# Patient Record
Sex: FEMALE | Race: WHITE | NOT HISPANIC OR LATINO
[De-identification: names, ages, dates, MRNs, and addresses within clinical notes are randomized per-mention and may not be internally consistent; named-entity substitution may affect disease eponyms.]

---

## 2019-04-16 PROBLEM — Z00.00 ENCOUNTER FOR PREVENTIVE HEALTH EXAMINATION: Status: ACTIVE | Noted: 2019-04-16

## 2019-05-16 ENCOUNTER — APPOINTMENT (OUTPATIENT)
Dept: RHEUMATOLOGY | Facility: CLINIC | Age: 74
End: 2019-05-16
Payer: MEDICARE

## 2019-05-16 VITALS
DIASTOLIC BLOOD PRESSURE: 80 MMHG | OXYGEN SATURATION: 96 % | SYSTOLIC BLOOD PRESSURE: 146 MMHG | BODY MASS INDEX: 28 KG/M2 | HEIGHT: 63 IN | WEIGHT: 158 LBS | HEART RATE: 78 BPM

## 2019-05-16 DIAGNOSIS — Z78.9 OTHER SPECIFIED HEALTH STATUS: ICD-10-CM

## 2019-05-16 PROCEDURE — 99205 OFFICE O/P NEW HI 60 MIN: CPT

## 2019-05-16 RX ORDER — ADHESIVE TAPE 3"X 2.3 YD
50 MCG TAPE, NON-MEDICATED TOPICAL
Refills: 0 | Status: ACTIVE | COMMUNITY

## 2019-05-16 NOTE — PHYSICAL EXAM
[General Appearance - Alert] : alert [General Appearance - Well Developed] : well developed [General Appearance - Well Nourished] : well nourished [General Appearance - In No Acute Distress] : in no acute distress [Sclera] : the sclera and conjunctiva were normal [Auscultation Breath Sounds / Voice Sounds] : lungs were clear to auscultation bilaterally [Cervical Lymph Nodes Enlarged Posterior Bilaterally] : posterior cervical [Cervical Lymph Nodes Enlarged Anterior Bilaterally] : anterior cervical [] : no rash [FreeTextEntry1] : There is bilateral GH tenderness. There is elbow joint line tenderness, left > right. There is left wrist tenderness. There is pain on ROM of the hips bilaterally. There is joint line tenderness of the left knee without swelling. There is MTP tenderness bilaterally.  There are marked Heberden's and Thelma's nodes. There is minimal crepitus of the right GH joint and in the knees. There is TMJ tenderness bilaterally

## 2019-05-16 NOTE — REASON FOR VISIT
[Initial Evaluation] : an initial evaluation [Family Member] : family member [FreeTextEntry1] : Shoulder and hip girdle pain and weakness

## 2019-05-16 NOTE — REVIEW OF SYSTEMS
[Joint Pain] : joint pain [Joint Stiffness] : joint stiffness [Fever] : no fever [Eye Pain] : no eye pain [Red Eyes] : eyes not red [Cough] : no cough [Shortness Of Breath] : no shortness of breath [Abdominal Pain] : no abdominal pain [Diarrhea] : no diarrhea [Dysuria] : no dysuria [Joint Swelling] : no joint swelling [Skin Lesions] : no skin lesions [FreeTextEntry6] : No pleuritic C/P

## 2019-05-16 NOTE — HISTORY OF PRESENT ILLNESS
[FreeTextEntry1] : Patient reports having started about one month ago with should discomfort with pain, AM stiffness and weakness with severe fatigue.  Her hip started to become symptomatic, and she presented to [facility] ED, and was referred here for evaluation. There has been no temporal HAs or visual loss, but is having symptoms of jaw claudication. There is no fever or weight loss.

## 2019-05-17 LAB
ALBUMIN SERPL ELPH-MCNC: 4.3 G/DL
ALP BLD-CCNC: 73 U/L
ALT SERPL-CCNC: 9 U/L
ANION GAP SERPL CALC-SCNC: 14 MMOL/L
AST SERPL-CCNC: 17 U/L
BASOPHILS # BLD AUTO: 0.04 K/UL
BASOPHILS NFR BLD AUTO: 0.5 %
BILIRUB SERPL-MCNC: 0.3 MG/DL
BUN SERPL-MCNC: 24 MG/DL
CALCIUM SERPL-MCNC: 9.5 MG/DL
CCP AB SER IA-ACNC: <8 UNITS
CHLORIDE SERPL-SCNC: 104 MMOL/L
CO2 SERPL-SCNC: 25 MMOL/L
CREAT SERPL-MCNC: 1.2 MG/DL
CRP SERPL-MCNC: 3.59 MG/DL
DSDNA AB SER-ACNC: 152 IU/ML
EOSINOPHIL # BLD AUTO: 0.3 K/UL
EOSINOPHIL NFR BLD AUTO: 4 %
ERYTHROCYTE [SEDIMENTATION RATE] IN BLOOD BY WESTERGREN METHOD: 53 MM/HR
GLUCOSE SERPL-MCNC: 87 MG/DL
HCT VFR BLD CALC: 44.8 %
HGB BLD-MCNC: 13.3 G/DL
IMM GRANULOCYTES NFR BLD AUTO: 0.1 %
LYMPHOCYTES # BLD AUTO: 1.51 K/UL
LYMPHOCYTES NFR BLD AUTO: 20.3 %
MAN DIFF?: NORMAL
MCHC RBC-ENTMCNC: 26 PG
MCHC RBC-ENTMCNC: 29.7 GM/DL
MCV RBC AUTO: 87.5 FL
MONOCYTES # BLD AUTO: 0.67 K/UL
MONOCYTES NFR BLD AUTO: 9 %
NEUTROPHILS # BLD AUTO: 4.91 K/UL
NEUTROPHILS NFR BLD AUTO: 66.1 %
PLATELET # BLD AUTO: 287 K/UL
POTASSIUM SERPL-SCNC: 4.9 MMOL/L
PROT SERPL-MCNC: 6.8 G/DL
RBC # BLD: 5.12 M/UL
RBC # FLD: 14.4 %
RF+CCP IGG SER-IMP: NEGATIVE
RHEUMATOID FACT SER QL: <10 IU/ML
SODIUM SERPL-SCNC: 143 MMOL/L
WBC # FLD AUTO: 7.44 K/UL

## 2019-05-20 ENCOUNTER — OTHER (OUTPATIENT)
Age: 74
End: 2019-05-20

## 2019-05-20 LAB
ANA PAT FLD IF-IMP: ABNORMAL
ANA SER IF-ACNC: ABNORMAL
ENA SS-A AB SER IA-ACNC: <0.2 AL
ENA SS-B AB SER IA-ACNC: <0.2 AL

## 2019-05-24 ENCOUNTER — APPOINTMENT (OUTPATIENT)
Dept: RHEUMATOLOGY | Facility: CLINIC | Age: 74
End: 2019-05-24

## 2019-05-29 ENCOUNTER — RECORD ABSTRACTING (OUTPATIENT)
Age: 74
End: 2019-05-29

## 2019-05-29 ENCOUNTER — APPOINTMENT (OUTPATIENT)
Dept: RHEUMATOLOGY | Facility: CLINIC | Age: 74
End: 2019-05-29
Payer: MEDICARE

## 2019-05-29 VITALS
HEART RATE: 79 BPM | BODY MASS INDEX: 28.35 KG/M2 | HEIGHT: 63 IN | SYSTOLIC BLOOD PRESSURE: 138 MMHG | WEIGHT: 160 LBS | DIASTOLIC BLOOD PRESSURE: 72 MMHG

## 2019-05-29 DIAGNOSIS — K76.0 FATTY (CHANGE OF) LIVER, NOT ELSEWHERE CLASSIFIED: ICD-10-CM

## 2019-05-29 DIAGNOSIS — F41.9 ANXIETY DISORDER, UNSPECIFIED: ICD-10-CM

## 2019-05-29 DIAGNOSIS — Z87.19 PERSONAL HISTORY OF OTHER DISEASES OF THE DIGESTIVE SYSTEM: ICD-10-CM

## 2019-05-29 PROCEDURE — 99214 OFFICE O/P EST MOD 30 MIN: CPT

## 2019-05-29 NOTE — PHYSICAL EXAM
[General Appearance - Alert] : alert [General Appearance - In No Acute Distress] : in no acute distress [General Appearance - Well Nourished] : well nourished [General Appearance - Well Developed] : well developed [] : no rash [FreeTextEntry1] : There are no palpable TA cords, but there is bilateral temproal tenderness.

## 2019-05-29 NOTE — REVIEW OF SYSTEMS
[Joint Pain] : joint pain [Joint Stiffness] : joint stiffness [Eye Pain] : no eye pain [Fever] : no fever [Red Eyes] : eyes not red [Shortness Of Breath] : no shortness of breath [Cough] : no cough [Abdominal Pain] : no abdominal pain [Diarrhea] : no diarrhea [Dysuria] : no dysuria [Joint Swelling] : no joint swelling [Skin Lesions] : no skin lesions [FreeTextEntry6] : No pleuritic C/P

## 2019-05-29 NOTE — HISTORY OF PRESENT ILLNESS
[FreeTextEntry1] : Feeling better on low dose prednisone, with less joint pain and stiffness. Patient tripped and fell one week ago, and has some mild pain in the right low back area.

## 2019-07-03 ENCOUNTER — RX RENEWAL (OUTPATIENT)
Age: 74
End: 2019-07-03

## 2019-07-26 ENCOUNTER — RECORD ABSTRACTING (OUTPATIENT)
Age: 74
End: 2019-07-26

## 2019-07-26 DIAGNOSIS — I49.9 CARDIAC ARRHYTHMIA, UNSPECIFIED: ICD-10-CM

## 2019-07-29 ENCOUNTER — APPOINTMENT (OUTPATIENT)
Dept: CARDIOLOGY | Facility: CLINIC | Age: 74
End: 2019-07-29

## 2019-07-30 NOTE — REASON FOR VISIT
[FreeTextEntry1] : Ms. PENELOPE SILVA has the following problem list.\par \par Hypertension\par Palpitations/cardiac arrhythmia/nonsustained atrial tachycardia\par Previous evaluation for dyspnea.\par \par She has additional medical problems as noted.\par \par Her primary care provider is Doctor Marv Lala\par

## 2019-07-30 NOTE — ADDENDUM
[FreeTextEntry1] : This report was generated using voice recognition software. Please excuse obvious typographical errors and contact this office for any questions. Any preliminary copy of this note given to the patient at the time of this visit has not been proofread or edited. This note is part of a shared electronic record used by our providers and may contain information generated by others in addition to those entries made during today's visit.\par

## 2019-08-29 ENCOUNTER — APPOINTMENT (OUTPATIENT)
Dept: RHEUMATOLOGY | Facility: CLINIC | Age: 74
End: 2019-08-29
Payer: MEDICARE

## 2019-08-29 VITALS
BODY MASS INDEX: 27.64 KG/M2 | RESPIRATION RATE: 18 BRPM | SYSTOLIC BLOOD PRESSURE: 134 MMHG | DIASTOLIC BLOOD PRESSURE: 72 MMHG | HEART RATE: 85 BPM | HEIGHT: 63 IN | WEIGHT: 156 LBS | OXYGEN SATURATION: 95 %

## 2019-08-29 PROCEDURE — 99214 OFFICE O/P EST MOD 30 MIN: CPT

## 2019-08-29 RX ORDER — AMLODIPINE BESYLATE 5 MG/1
5 TABLET ORAL
Refills: 0 | Status: DISCONTINUED | COMMUNITY
End: 2019-08-29

## 2019-08-29 RX ORDER — PAROXETINE HYDROCHLORIDE 30 MG/1
30 TABLET, FILM COATED ORAL
Refills: 0 | Status: DISCONTINUED | COMMUNITY
End: 2019-08-29

## 2019-08-29 RX ORDER — NAPROXEN 500 MG/1
500 TABLET ORAL
Refills: 0 | Status: DISCONTINUED | COMMUNITY
End: 2019-08-29

## 2019-08-29 NOTE — HISTORY OF PRESENT ILLNESS
[FreeTextEntry1] : Patient reports groin pain (hip pain) has resolved while on Plaquenil, but now having increased pain in the upper back, shoulders and neck; fells crepitations in the neck and shoulder while moving and after increased activity. No progression of symptoms related to BIN positivity.

## 2019-08-29 NOTE — PHYSICAL EXAM
[General Appearance - Alert] : alert [General Appearance - In No Acute Distress] : in no acute distress [General Appearance - Well Nourished] : well nourished [General Appearance - Well Developed] : well developed [] : no rash [FreeTextEntry1] : There is no evidence of active synovitis at this time. There are prominent Heberden's nodes. There is crepitus of the GH joints.

## 2019-08-29 NOTE — REVIEW OF SYSTEMS
[Joint Pain] : joint pain [Joint Stiffness] : joint stiffness [Fever] : no fever [Eye Pain] : no eye pain [Red Eyes] : eyes not red [Shortness Of Breath] : no shortness of breath [Cough] : no cough [Abdominal Pain] : no abdominal pain [Diarrhea] : no diarrhea [Dysuria] : no dysuria [Joint Swelling] : no joint swelling [Skin Lesions] : no skin lesions [FreeTextEntry6] : No pleuritic C/P

## 2019-08-31 LAB
APPEARANCE: ABNORMAL
BACTERIA: NEGATIVE
BILIRUBIN URINE: NEGATIVE
BLOOD URINE: NEGATIVE
COLOR: YELLOW
CREAT SPEC-SCNC: 130 MG/DL
CREAT/PROT UR: 0.1 RATIO
GLUCOSE QUALITATIVE U: NEGATIVE
HYALINE CASTS: 0 /LPF
KETONES URINE: NEGATIVE
LEUKOCYTE ESTERASE URINE: ABNORMAL
MICROSCOPIC-UA: NORMAL
NITRITE URINE: NEGATIVE
PH URINE: 5.5
PROT UR-MCNC: 15 MG/DL
PROTEIN URINE: ABNORMAL
RED BLOOD CELLS URINE: 0 /HPF
SPECIFIC GRAVITY URINE: 1.02
SQUAMOUS EPITHELIAL CELLS: 14 /HPF
UROBILINOGEN URINE: NORMAL
WHITE BLOOD CELLS URINE: 7 /HPF

## 2019-10-14 ENCOUNTER — RX RENEWAL (OUTPATIENT)
Age: 74
End: 2019-10-14

## 2019-12-05 ENCOUNTER — APPOINTMENT (OUTPATIENT)
Dept: RHEUMATOLOGY | Facility: CLINIC | Age: 74
End: 2019-12-05
Payer: MEDICARE

## 2019-12-05 VITALS
HEIGHT: 60 IN | SYSTOLIC BLOOD PRESSURE: 112 MMHG | WEIGHT: 153 LBS | BODY MASS INDEX: 30.04 KG/M2 | DIASTOLIC BLOOD PRESSURE: 74 MMHG

## 2019-12-05 DIAGNOSIS — E50.7 OTHER OCULAR MANIFESTATIONS OF VITAMIN A DEFICIENCY: ICD-10-CM

## 2019-12-05 PROCEDURE — 99214 OFFICE O/P EST MOD 30 MIN: CPT

## 2019-12-05 NOTE — REVIEW OF SYSTEMS
[Joint Stiffness] : joint stiffness [Joint Pain] : joint pain [Fever] : no fever [Red Eyes] : eyes not red [Shortness Of Breath] : no shortness of breath [Eye Pain] : no eye pain [Cough] : no cough [Abdominal Pain] : no abdominal pain [Diarrhea] : no diarrhea [Dysuria] : no dysuria [Joint Swelling] : no joint swelling [Skin Lesions] : no skin lesions [FreeTextEntry6] : No pleuritic C/P

## 2019-12-05 NOTE — HISTORY OF PRESENT ILLNESS
[FreeTextEntry1] : Patient reports that there is some significant improvement in joint symptoms after making the changes in the medication regimen at last visit. she is complaining of increasing dry eyes, using Visine without benefit. Is now seeing ophtho for retinopathy monitoring.

## 2020-01-21 ENCOUNTER — RX RENEWAL (OUTPATIENT)
Age: 75
End: 2020-01-21

## 2020-01-27 ENCOUNTER — RX RENEWAL (OUTPATIENT)
Age: 75
End: 2020-01-27

## 2020-05-14 ENCOUNTER — APPOINTMENT (OUTPATIENT)
Dept: RHEUMATOLOGY | Facility: CLINIC | Age: 75
End: 2020-05-14
Payer: MEDICARE

## 2020-05-14 VITALS — HEIGHT: 60 IN | WEIGHT: 151 LBS | BODY MASS INDEX: 29.64 KG/M2

## 2020-05-14 PROCEDURE — 20610 DRAIN/INJ JOINT/BURSA W/O US: CPT

## 2020-05-14 PROCEDURE — 99214 OFFICE O/P EST MOD 30 MIN: CPT | Mod: 25

## 2020-05-14 RX ADMIN — Medication 0 MG/ML: at 00:00

## 2020-05-14 NOTE — REVIEW OF SYSTEMS
[Joint Pain] : joint pain [Joint Stiffness] : joint stiffness [Fever] : no fever [Eye Pain] : no eye pain [Red Eyes] : eyes not red [Shortness Of Breath] : no shortness of breath [Cough] : no cough [Abdominal Pain] : no abdominal pain [Diarrhea] : no diarrhea [Dysuria] : no dysuria [Joint Swelling] : no joint swelling [FreeTextEntry6] : No pleuritic C/P [Skin Lesions] : no skin lesions

## 2020-05-14 NOTE — PROCEDURE
[FreeTextEntry1] : Right trochanteric bursa area prepped with betadine; 2% lido applied to subQ tissue; 2cc 2% lido and 2cc Kenalog 40 (80 mg) injected into area of right trochanteric bursa with good results and no complications. Has not recently seen ophtho because of Covid restrictions, but is planning on seeing ophtho regularly.

## 2020-05-14 NOTE — HISTORY OF PRESENT ILLNESS
[FreeTextEntry1] : Patient reports progressively severe pain in the lateral aspect of the right hip, making it very difficult to walk. Pain radiates into lateral aspect of right leg. she does report that changing her antidepressant to duloxetine at the last visit did give relief of OA symptoms. No progression of symptoms.

## 2020-05-14 NOTE — PHYSICAL EXAM
[General Appearance - In No Acute Distress] : in no acute distress [General Appearance - Alert] : alert [General Appearance - Well Developed] : well developed [General Appearance - Well Nourished] : well nourished [] : no rash [FreeTextEntry1] : There is no evidence of active synovitis at this time. There are prominent Heberden's nodes. There is crepitus of the GH joints. There is no pain on ROM of the right hip. There is severe local tenderness over the right trochanteric bursa area - this reproduces patient's CC.

## 2020-05-15 RX ORDER — TRIAMCINOLONE ACETONIDE 40 MG/ML
40 SUSPENSION INTRA-ARTERIAL; INTRAMUSCULAR
Qty: 1 | Refills: 0 | Status: COMPLETED
Start: 2020-05-14

## 2020-05-15 RX ORDER — TRIAMCINOLONE ACETONIDE 40 MG/ML
40 SUSPENSION INTRA-ARTERIAL; INTRAMUSCULAR
Qty: 1 | Refills: 0 | Status: COMPLETED | OUTPATIENT
Start: 2020-05-14

## 2020-10-28 ENCOUNTER — RX RENEWAL (OUTPATIENT)
Age: 75
End: 2020-10-28

## 2020-11-11 ENCOUNTER — APPOINTMENT (OUTPATIENT)
Dept: RHEUMATOLOGY | Facility: CLINIC | Age: 75
End: 2020-11-11
Payer: MEDICARE

## 2020-11-11 VITALS
BODY MASS INDEX: 28.66 KG/M2 | WEIGHT: 146 LBS | DIASTOLIC BLOOD PRESSURE: 68 MMHG | SYSTOLIC BLOOD PRESSURE: 120 MMHG | HEIGHT: 60 IN

## 2020-11-11 DIAGNOSIS — I87.2 VENOUS INSUFFICIENCY (CHRONIC) (PERIPHERAL): ICD-10-CM

## 2020-11-11 PROCEDURE — 99214 OFFICE O/P EST MOD 30 MIN: CPT | Mod: 25

## 2020-11-11 PROCEDURE — 20610 DRAIN/INJ JOINT/BURSA W/O US: CPT

## 2020-11-12 ENCOUNTER — MED ADMIN CHARGE (OUTPATIENT)
Age: 75
End: 2020-11-12

## 2020-11-12 PROBLEM — I87.2 VENOUS INSUFFICIENCY: Status: ACTIVE | Noted: 2020-11-11

## 2020-11-12 RX ORDER — TRIAMCINOLONE ACETONIDE 40 MG/ML
40 SUSPENSION INTRA-ARTERIAL; INTRAMUSCULAR
Qty: 1 | Refills: 0 | Status: COMPLETED | OUTPATIENT
Start: 2020-11-12

## 2020-11-12 RX ADMIN — TRIAMCINOLONE ACETONIDE 0 MG/ML: 40 INJECTION, SUSPENSION INTRA-ARTICULAR; INTRAMUSCULAR at 00:00

## 2020-11-12 NOTE — HISTORY OF PRESENT ILLNESS
[FreeTextEntry1] : Patient reports that she has been having increase pain in the shoulder without much increase in AM stiffness; also having increased neck pain without radiation. Taking Plaquenil as Rx'ed; sees ophtho regularly.  Trochanteric bursitis much improved after steroid injection given at last visit, but having recurrence of symptoms and requesting repeat steroid injection.

## 2020-11-12 NOTE — PHYSICAL EXAM
[General Appearance - Alert] : alert [General Appearance - In No Acute Distress] : in no acute distress [General Appearance - Well Nourished] : well nourished [General Appearance - Well Developed] : well developed [] : no rash [FreeTextEntry1] : There is no evidence of active synovitis at this time. There are prominent Heberden's nodes. There is crepitus of the GH joints. There is no pain on ROM of the right hip. There is severe local tenderness over the right trochanteric bursa area - this reproduces patient's CC.

## 2021-05-25 ENCOUNTER — APPOINTMENT (OUTPATIENT)
Dept: RHEUMATOLOGY | Facility: CLINIC | Age: 76
End: 2021-05-25

## 2022-03-21 ENCOUNTER — RX RENEWAL (OUTPATIENT)
Age: 77
End: 2022-03-21

## 2022-03-23 ENCOUNTER — RX RENEWAL (OUTPATIENT)
Age: 77
End: 2022-03-23

## 2022-04-11 ENCOUNTER — APPOINTMENT (OUTPATIENT)
Dept: RHEUMATOLOGY | Facility: CLINIC | Age: 77
End: 2022-04-11
Payer: MEDICARE

## 2022-04-11 VITALS
WEIGHT: 149 LBS | OXYGEN SATURATION: 98 % | HEART RATE: 75 BPM | SYSTOLIC BLOOD PRESSURE: 130 MMHG | HEIGHT: 60 IN | BODY MASS INDEX: 29.25 KG/M2 | DIASTOLIC BLOOD PRESSURE: 70 MMHG

## 2022-04-11 DIAGNOSIS — N28.9 DISORDER OF KIDNEY AND URETER, UNSPECIFIED: ICD-10-CM

## 2022-04-11 PROCEDURE — 99214 OFFICE O/P EST MOD 30 MIN: CPT

## 2022-04-11 RX ORDER — LEVOTHYROXINE SODIUM 0.2 MG/1
200 TABLET ORAL
Refills: 0 | Status: ACTIVE | COMMUNITY

## 2022-04-11 NOTE — HISTORY OF PRESENT ILLNESS
[FreeTextEntry1] : Patient did not follow up in 2021, and her duloxetine was not renewed. After it was renewed

## 2022-04-11 NOTE — PHYSICAL EXAM
[General Appearance - Alert] : alert [General Appearance - In No Acute Distress] : in no acute distress [General Appearance - Well Nourished] : well nourished [General Appearance - Well Developed] : well developed [] : no rash [FreeTextEntry1] : There is no evidence of active synovitis at this time. There are prominent Heberden's nodes. There is crepitus of the GH joints. There is no pain on ROM of the right hip.

## 2022-04-22 ENCOUNTER — TRANSCRIPTION ENCOUNTER (OUTPATIENT)
Age: 77
End: 2022-04-22

## 2023-04-12 ENCOUNTER — RX RENEWAL (OUTPATIENT)
Age: 78
End: 2023-04-12

## 2023-04-17 ENCOUNTER — APPOINTMENT (OUTPATIENT)
Dept: RHEUMATOLOGY | Facility: CLINIC | Age: 78
End: 2023-04-17

## 2023-06-12 ENCOUNTER — RX RENEWAL (OUTPATIENT)
Age: 78
End: 2023-06-12

## 2023-07-25 ENCOUNTER — APPOINTMENT (OUTPATIENT)
Dept: RHEUMATOLOGY | Facility: CLINIC | Age: 78
End: 2023-07-25
Payer: MEDICARE

## 2023-07-25 VITALS
SYSTOLIC BLOOD PRESSURE: 122 MMHG | WEIGHT: 149 LBS | DIASTOLIC BLOOD PRESSURE: 70 MMHG | OXYGEN SATURATION: 98 % | HEIGHT: 60 IN | HEART RATE: 84 BPM | BODY MASS INDEX: 29.25 KG/M2

## 2023-07-25 PROCEDURE — 99214 OFFICE O/P EST MOD 30 MIN: CPT | Mod: 25

## 2023-07-25 PROCEDURE — 20610 DRAIN/INJ JOINT/BURSA W/O US: CPT | Mod: RT

## 2023-07-25 RX ORDER — TRIAMCINOLONE ACETONIDE 40 MG/ML
40 SUSPENSION INTRA-ARTERIAL; INTRAMUSCULAR
Qty: 1 | Refills: 0 | Status: COMPLETED
Start: 2023-07-25 | End: 2023-07-26

## 2023-07-25 RX ORDER — ACETAMINOPHEN 650 MG/1
650 TABLET, EXTENDED RELEASE ORAL
Qty: 60 | Refills: 0 | Status: ACTIVE | COMMUNITY
Start: 2022-04-11

## 2023-07-25 RX ORDER — TRIAMCINOLONE ACETONIDE 40 MG/ML
40 SUSPENSION INTRA-ARTERIAL; INTRAMUSCULAR
Qty: 1 | Refills: 0 | Status: COMPLETED | OUTPATIENT
Start: 2023-07-25 | End: 2023-07-26

## 2023-07-25 RX ADMIN — TRIAMCINOLONE ACETONIDE 0 MG/ML: 40 SUSPENSION INTRA-ARTERIAL; INTRAMUSCULAR at 00:00

## 2023-07-25 NOTE — PHYSICAL EXAM
[General Appearance - Alert] : alert [General Appearance - In No Acute Distress] : in no acute distress [General Appearance - Well Nourished] : well nourished [General Appearance - Well Developed] : well developed [] : no rash [Motor Exam] : the motor exam was normal [FreeTextEntry1] : There is no evidence of active synovitis at this time. There are prominent Heberden's nodes. There is crepitus of the GH joints. There is no pain on ROM of the right hip. There is severe local tenderness over the right trochanteric bursa area - this reproduces patient's CC. There is pain on passive abduction of the right shoulder to 80 degrees - this also reproduces patient's CC.

## 2023-07-25 NOTE — PROCEDURE
[FreeTextEntry1] : Right trochanteric bursa area prepped with betadine; 2% lido applied to subQ tissue; 2cc 2% lido and 2cc Kenalog 40 (80 mg) injected into area of right trochanteric bursa with good results and no complications. \par \par Right shoulder prepped with betadine; 1% lido applied to subQ tissue; 2cc 1% lido and 2cc Kenalog 40 (80 mg) injected into area of right subacromial bursa with good results and no complications.

## 2023-07-25 NOTE — HISTORY OF PRESENT ILLNESS
[FreeTextEntry1] : There has not been any progression of symptoms but complains of recurrence of troch bursitis symptoms, and now having some increased right shoulder pain.

## 2024-01-21 ENCOUNTER — RESULT REVIEW (OUTPATIENT)
Age: 79
End: 2024-01-21

## 2024-01-22 ENCOUNTER — RESULT REVIEW (OUTPATIENT)
Age: 79
End: 2024-01-22

## 2024-01-25 ENCOUNTER — TRANSCRIPTION ENCOUNTER (OUTPATIENT)
Age: 79
End: 2024-01-25

## 2024-01-26 ENCOUNTER — RX RENEWAL (OUTPATIENT)
Age: 79
End: 2024-01-26

## 2024-01-30 ENCOUNTER — TRANSCRIPTION ENCOUNTER (OUTPATIENT)
Age: 79
End: 2024-01-30

## 2024-01-31 ENCOUNTER — RX RENEWAL (OUTPATIENT)
Age: 79
End: 2024-01-31

## 2024-02-01 ENCOUNTER — APPOINTMENT (OUTPATIENT)
Dept: CARE COORDINATION | Facility: HOME HEALTH | Age: 79
End: 2024-02-01
Payer: MEDICARE

## 2024-02-01 VITALS
HEART RATE: 88 BPM | OXYGEN SATURATION: 97 % | RESPIRATION RATE: 18 BRPM | DIASTOLIC BLOOD PRESSURE: 78 MMHG | SYSTOLIC BLOOD PRESSURE: 140 MMHG

## 2024-02-01 DIAGNOSIS — Z92.89 PERSONAL HISTORY OF OTHER MEDICAL TREATMENT: ICD-10-CM

## 2024-02-01 DIAGNOSIS — Z01.89 ENCOUNTER FOR OTHER SPECIFIED SPECIAL EXAMINATIONS: ICD-10-CM

## 2024-02-01 PROCEDURE — 99349 HOME/RES VST EST MOD MDM 40: CPT

## 2024-02-01 RX ORDER — PANTOPRAZOLE SODIUM 40 MG/1
40 TABLET, DELAYED RELEASE ORAL
Refills: 0 | Status: ACTIVE | COMMUNITY

## 2024-02-01 RX ORDER — ALBUTEROL SULFATE 0.63 MG/3ML
0.63 SOLUTION RESPIRATORY (INHALATION)
Refills: 0 | Status: ACTIVE | COMMUNITY

## 2024-02-01 NOTE — PHYSICAL EXAM
[No Acute Distress] : no acute distress [Well Nourished] : well nourished [Well Developed] : well developed [Normal] : no jugular venous distention, supple, no lymphadenopathy and the thyroid was normal and there were no nodules present [No Respiratory Distress] : no respiratory distress  [No Accessory Muscle Use] : no accessory muscle use [Bibasilar Rales/Crackles] : bibasilar rales/crackles were heard [Normal Rate] : normal rate  [Regular Rhythm] : with a regular rhythm [Normal S1, S2] : normal S1 and S2 [No Varicosities] : no varicosities [No Edema] : there was no peripheral edema [Soft] : abdomen soft [Non Tender] : non-tender [Non-distended] : non-distended [No Joint Swelling] : no joint swelling [Normal Gait] : normal gait [Normal Affect] : the affect was normal [Normal Insight/Judgement] : insight and judgment were intact [de-identified] : + bruising to bilateral arms s/p anticoagulant injection inpatient

## 2024-02-01 NOTE — ASSESSMENT
[FreeTextEntry1] : Patient is a 78 year old female enrolled in the [facility] program with a history of hypertension, hypothyroidism, arthritis, SLE, hysterectomy, intestinal disorder. Patient was admitted to [facility] for PNA.    Hospital chart reviewed and copied as per [facility] Discharge Summary: "The patient is an 78-year-old female with past medical history of hypertension, hypothyroidism, arthritis, SLE, hysterectomy, intestinal disorder, presented to the hospital with weakness, cough, chills, fever, shortness of breath, reduced oral intake.  The patient was worked   up in the hospital.  The patient was found to have crackles on lung exam.  The patient was short of breath, hypoxic, given oxygen 2 L via nasal cannula.  During the hospitalization, the patient had blood cultures obtained which were negative.  Urinalysis suggested infection.  Urine culture was contaminated.  The patient was treated clinically with IV Rocephin.  The patient was given Tamiflu, oxygen, pulmonary support with albuterol.  The patient continued to improve slowly.  The patient was very weak and lethargic.  The patient also received steroids as she was quite short of breath with wheezing and crackles. As the patient continues to improve, the patient is being switched to oral medications with outpatient follow up. Preventative education given to the patient.  The patient advised to avoid sick contacts and to   follow up with medical doctor within a week of discharge   Patient observed via home visit and is alert and oriented x 3, in no acute distress. Patient observed sitting comfortably upright in living room chair with daughter present at time of visit. Patient states they are feeling "tired" today. Patient reports she was seen by PCP on 1/30/24 where she had repeat CT of chest done showing persistent PNA- was extended for her course of steroids. Patient reports compliance with medications- states she took the last dose of Abx today. States she is to be seen by visiting nurse tomorrow and f/u with PCP again at the end of the month. Reports fatigue, cough and mild ARRIOLA. Ambulates without walker. Denies fever, chills. Patient states they are eating and drinking well. Denies any abdominal pain, palpitations, nausea, vomiting, diarrhea, lightheadedness, dizziness, shortness of breath, or chest pain.

## 2024-02-01 NOTE — HISTORY OF PRESENT ILLNESS
[FreeTextEntry1] : Follow-up for discharge from [facility] for PNA.  [de-identified] : Patient is a 78 year old female enrolled in the [employer] program with a history of hypertension, hypothyroidism, arthritis, SLE, hysterectomy, intestinal disorder. Patient was admitted to [facility] for PNA.    Hospital chart reviewed and copied as per [facility] Discharge Summary: "The patient is an 78-year-old female with past medical history of hypertension, hypothyroidism, arthritis, SLE, hysterectomy, intestinal disorder, presented to the hospital with weakness, cough, chills, fever, shortness of breath, reduced oral intake.  The patient was worked   up in the hospital.  The patient was found to have crackles on lung exam.  The patient was short of breath, hypoxic, given oxygen 2 L via nasal cannula.  During the hospitalization, the patient had blood cultures obtained which were negative.  Urinalysis suggested infection.  Urine culture was contaminated.  The patient was treated clinically with IV Rocephin.  The patient was given Tamiflu, oxygen, pulmonary support with albuterol.  The patient continued to improve slowly.  The patient was very weak and lethargic.  The patient also received steroids as she was quite short of breath with wheezing and crackles. As the patient continues to improve, the patient is being switched to oral medications with outpatient follow up. Preventative education given to the patient.  The patient advised to avoid sick contacts and to   follow up with medical doctor within a week of discharge   Patient observed via home visit and is alert and oriented x 3, in no acute distress. Patient observed sitting comfortably upright in living room chair with daughter present at time of visit. Patient states they are feeling "tired" today. Patient reports she was seen by PCP on 1/30/24 where she had repeat CT of chest done showing persistent PNA- was extended for her course of steroids. Patient reports compliance with medications- states she took the last dose of Abx today. States she is to be seen by visiting nurse tomorrow and f/u with PCP again at the end of the month. Reports fatigue, cough and mild ARRIOLA. Ambulates without walker. Denies fever, chills. Patient states they are eating and drinking well. Denies any abdominal pain, palpitations, nausea, vomiting, diarrhea, lightheadedness, dizziness, shortness of breath, or chest pain.

## 2024-02-01 NOTE — REVIEW OF SYSTEMS
[Fever] : no fever [Chills] : no chills [Fatigue] : fatigue [Hot Flashes] : no hot flashes [Night Sweats] : no night sweats [Chest Pain] : no chest pain [Palpitations] : no palpitations [Leg Claudication] : no leg claudication [Lower Ext Edema] : no lower extremity edema [Orthopnea] : no orthopnea [Shortness Of Breath] : no shortness of breath [Wheezing] : no wheezing [Cough] : cough [Dyspnea on Exertion] : dyspnea on exertion [Abdominal Pain] : no abdominal pain [Nausea] : no nausea [Constipation] : no constipation [Diarrhea] : diarrhea [Dysuria] : no dysuria [Hematuria] : no hematuria [Joint Pain] : no joint pain [Muscle Pain] : no muscle pain [Itching] : no itching [Headache] : no headache [Dizziness] : no dizziness [Fainting] : no fainting [Memory Loss] : no memory loss [Suicidal] : not suicidal [Anxiety] : no anxiety [Easy Bleeding] : no easy bleeding [Easy Bruising] : no easy bruising [Negative] : Heme/Lymph [de-identified] : + bruising to both arms

## 2024-02-05 ENCOUNTER — APPOINTMENT (OUTPATIENT)
Dept: CARE COORDINATION | Facility: HOME HEALTH | Age: 79
End: 2024-02-05
Payer: MEDICARE

## 2024-02-05 VITALS
SYSTOLIC BLOOD PRESSURE: 145 MMHG | RESPIRATION RATE: 18 BRPM | HEART RATE: 74 BPM | DIASTOLIC BLOOD PRESSURE: 88 MMHG | OXYGEN SATURATION: 96 %

## 2024-02-05 DIAGNOSIS — R58 HEMORRHAGE, NOT ELSEWHERE CLASSIFIED: ICD-10-CM

## 2024-02-05 PROCEDURE — 99348 HOME/RES VST EST LOW MDM 30: CPT

## 2024-02-05 NOTE — PHYSICAL EXAM
[No Acute Distress] : no acute distress [Well Nourished] : well nourished [Well Developed] : well developed [Normal] : no jugular venous distention, supple, no lymphadenopathy and the thyroid was normal and there were no nodules present [No Respiratory Distress] : no respiratory distress  [No Accessory Muscle Use] : no accessory muscle use [Bibasilar Rales/Crackles] : bibasilar rales/crackles were heard [Normal Rate] : normal rate  [Regular Rhythm] : with a regular rhythm [Normal S1, S2] : normal S1 and S2 [No Varicosities] : no varicosities [No Edema] : there was no peripheral edema [Soft] : abdomen soft [Non Tender] : non-tender [Non-distended] : non-distended [No Joint Swelling] : no joint swelling [Normal Gait] : normal gait [Normal Affect] : the affect was normal [Normal Insight/Judgement] : insight and judgment were intact [de-identified] : + bruising to bilateral arms s/p anticoagulant injection inpatient

## 2024-02-05 NOTE — HISTORY OF PRESENT ILLNESS
[FreeTextEntry1] : Follow-up for discharge from [facility] for PNA.  [de-identified] : Patient is a 78 year old female enrolled in the [employer] program with a history of hypertension, hypothyroidism, arthritis, SLE, hysterectomy, intestinal disorder. Patient was admitted to [facility] for PNA.    Hospital chart reviewed and copied as per [facility] Discharge Summary: "The patient is an 78-year-old female with past medical history of hypertension, hypothyroidism, arthritis, SLE, hysterectomy, intestinal disorder, presented to the hospital with weakness, cough, chills, fever, shortness of breath, reduced oral intake.  The patient was worked   up in the hospital.  The patient was found to have crackles on lung exam.  The patient was short of breath, hypoxic, given oxygen 2 L via nasal cannula.  During the hospitalization, the patient had blood cultures obtained which were negative.  Urinalysis suggested infection.  Urine culture was contaminated.  The patient was treated clinically with IV Rocephin.  The patient was given Tamiflu, oxygen, pulmonary support with albuterol.  The patient continued to improve slowly.  The patient was very weak and lethargic.  The patient also received steroids as she was quite short of breath with wheezing and crackles. As the patient continues to improve, the patient is being switched to oral medications with outpatient follow up. Preventative education given to the patient.  The patient advised to avoid sick contacts and to   follow up with medical doctor within a week of discharge   Patient observed for follow up via home visit and is alert and oriented x 3, in no acute distress. Patient observed sitting comfortably upright in living room chair with daughter present at time of visit. Patient states they are feeling "a little better" today. Patient reports she was seen by PCP on 1/30/24 where she had repeat CT of chest done showing persistent PNA- was extended for her course of steroids- has 2 days left until completion. Patient reports compliance with medications- completed Abx. States she is to be seen by visiting nurse today and f/u with PCP again at the end of the month. Reports persistent but improved fatigue, cough and mild ARRIOLA. Ambulates without walker. Denies fever, chills. Patient states they are eating and drinking well. Denies any abdominal pain, palpitations, nausea, vomiting, diarrhea, lightheadedness, dizziness, shortness of breath, or chest pain.

## 2024-02-05 NOTE — REVIEW OF SYSTEMS
[Fever] : no fever [Chills] : no chills [Fatigue] : fatigue [Hot Flashes] : no hot flashes [Night Sweats] : no night sweats [Chest Pain] : no chest pain [Palpitations] : no palpitations [Leg Claudication] : no leg claudication [Lower Ext Edema] : no lower extremity edema [Orthopnea] : no orthopnea [Shortness Of Breath] : no shortness of breath [Wheezing] : no wheezing [Cough] : cough [Dyspnea on Exertion] : dyspnea on exertion [Abdominal Pain] : no abdominal pain [Nausea] : no nausea [Constipation] : no constipation [Diarrhea] : diarrhea [Dysuria] : no dysuria [Hematuria] : no hematuria [Joint Pain] : no joint pain [Muscle Pain] : no muscle pain [Itching] : no itching [Headache] : no headache [Dizziness] : no dizziness [Fainting] : no fainting [Memory Loss] : no memory loss [Suicidal] : not suicidal [Anxiety] : no anxiety [Easy Bleeding] : no easy bleeding [Easy Bruising] : no easy bruising [Negative] : Heme/Lymph [de-identified] : + bruising to both arms

## 2024-02-05 NOTE — ASSESSMENT
[FreeTextEntry1] : Patient is a 78 year old female enrolled in the [employer] program with a history of hypertension, hypothyroidism, arthritis, SLE, hysterectomy, intestinal disorder. Patient was admitted to [facility] for PNA.    Hospital chart reviewed and copied as per [facility] Discharge Summary: "The patient is an 78-year-old female with past medical history of hypertension, hypothyroidism, arthritis, SLE, hysterectomy, intestinal disorder, presented to the hospital with weakness, cough, chills, fever, shortness of breath, reduced oral intake.  The patient was worked   up in the hospital.  The patient was found to have crackles on lung exam.  The patient was short of breath, hypoxic, given oxygen 2 L via nasal cannula.  During the hospitalization, the patient had blood cultures obtained which were negative.  Urinalysis suggested infection.  Urine culture was contaminated.  The patient was treated clinically with IV Rocephin.  The patient was given Tamiflu, oxygen, pulmonary support with albuterol.  The patient continued to improve slowly.  The patient was very weak and lethargic.  The patient also received steroids as she was quite short of breath with wheezing and crackles. As the patient continues to improve, the patient is being switched to oral medications with outpatient follow up. Preventative education given to the patient.  The patient advised to avoid sick contacts and to   follow up with medical doctor within a week of discharge   Patient observed for follow up via home visit and is alert and oriented x 3, in no acute distress. Patient observed sitting comfortably upright in living room chair with daughter present at time of visit. Patient states they are feeling "a little better" today. Patient reports she was seen by PCP on 1/30/24 where she had repeat CT of chest done showing persistent PNA- was extended for her course of steroids- has 2 days left until completion. Patient reports compliance with medications- completed Abx. States she is to be seen by visiting nurse today and f/u with PCP again at the end of the month. Reports persistent but improved fatigue, cough and mild ARRIOLA. Ambulates without walker. Denies fever, chills. Patient states they are eating and drinking well. Denies any abdominal pain, palpitations, nausea, vomiting, diarrhea, lightheadedness, dizziness, shortness of breath, or chest pain.

## 2024-02-09 ENCOUNTER — APPOINTMENT (OUTPATIENT)
Dept: CARE COORDINATION | Facility: HOME HEALTH | Age: 79
End: 2024-02-09
Payer: MEDICARE

## 2024-02-09 VITALS
RESPIRATION RATE: 18 BRPM | SYSTOLIC BLOOD PRESSURE: 134 MMHG | HEART RATE: 80 BPM | DIASTOLIC BLOOD PRESSURE: 80 MMHG | OXYGEN SATURATION: 98 %
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
## 2024-02-09 PROCEDURE — 99348 HOME/RES VST EST LOW MDM 30: CPT

## 2024-02-09 NOTE — ASSESSMENT
[FreeTextEntry1] : Patient is a 78 year old female enrolled in the [facility] program with a history of hypertension, hypothyroidism, arthritis, SLE, hysterectomy, intestinal disorder. Patient was admitted to [facility] for PNA.    Hospital chart reviewed and copied as per [facility] Discharge Summary: "The patient is an 78-year-old female with past medical history of hypertension, hypothyroidism, arthritis, SLE, hysterectomy, intestinal disorder, presented to the hospital with weakness, cough, chills, fever, shortness of breath, reduced oral intake.  The patient was worked   up in the hospital.  The patient was found to have crackles on lung exam.  The patient was short of breath, hypoxic, given oxygen 2 L via nasal cannula.  During the hospitalization, the patient had blood cultures obtained which were negative.  Urinalysis suggested infection.  Urine culture was contaminated.  The patient was treated clinically with IV Rocephin.  The patient was given Tamiflu, oxygen, pulmonary support with albuterol.  The patient continued to improve slowly.  The patient was very weak and lethargic.  The patient also received steroids as she was quite short of breath with wheezing and crackles. As the patient continues to improve, the patient is being switched to oral medications with outpatient follow up. Preventative education given to the patient.  The patient advised to avoid sick contacts and to   follow up with medical doctor within a week of discharge   Patient observed for follow up via home visit and is alert and oriented x 3, in no acute distress. Patient observed sitting comfortably upright in living room chair  at time of visit. Patient states they are feeling "better" today. Patient reports fall at home yesterday 2/8/24, states she was wearing shoes with poor  and fell in living room. Patient denies any injury associated with the fall. Patient reports he  recently returned home from rehab and she has been overwhelmed with trying to care for him. Patient reports she was seen by PCP on 1/30/24 where she had repeat CT of chest done showing persistent PNA- was extended for her course of steroids- completed at this point. Patient reports compliance with medications- completed Abx. States she is no longer being followed by visiting nurse or PT by her request. Reports improved fatigue, cough and still with mild ARRIOLA. Ambulates without walker. Denies fever, chills. Patient states they are eating and drinking well. Denies any abdominal pain, palpitations, nausea, vomiting, diarrhea, lightheadedness, dizziness, shortness of breath, or chest pain.

## 2024-02-09 NOTE — PHYSICAL EXAM
[No Acute Distress] : no acute distress [Well Nourished] : well nourished [Well Developed] : well developed [Normal] : no jugular venous distention, supple, no lymphadenopathy and the thyroid was normal and there were no nodules present [No Respiratory Distress] : no respiratory distress  [No Accessory Muscle Use] : no accessory muscle use [Clear to Auscultation] : lungs were clear to auscultation bilaterally [Normal Rate] : normal rate  [Regular Rhythm] : with a regular rhythm [Normal S1, S2] : normal S1 and S2 [No Varicosities] : no varicosities [No Edema] : there was no peripheral edema [Soft] : abdomen soft [Non Tender] : non-tender [Non-distended] : non-distended [No Joint Swelling] : no joint swelling [Normal Gait] : normal gait [Normal Affect] : the affect was normal [Normal Insight/Judgement] : insight and judgment were intact [de-identified] : + bruising to bilateral arms s/p anticoagulant injection inpatient

## 2024-02-09 NOTE — HISTORY OF PRESENT ILLNESS
[FreeTextEntry1] : Follow-up for discharge from [facility] [place] for PNA.  [de-identified] : Patient is a 78 year old female enrolled in the [facility] program with a history of hypertension, hypothyroidism, arthritis, SLE, hysterectomy, intestinal disorder. Patient was admitted to [facility] for PNA.    Hospital chart reviewed and copied as per [facility] Discharge Summary: "The patient is an 78-year-old female with past medical history of hypertension, hypothyroidism, arthritis, SLE, hysterectomy, intestinal disorder, presented to the hospital with weakness, cough, chills, fever, shortness of breath, reduced oral intake.  The patient was worked   up in the hospital.  The patient was found to have crackles on lung exam.  The patient was short of breath, hypoxic, given oxygen 2 L via nasal cannula.  During the hospitalization, the patient had blood cultures obtained which were negative.  Urinalysis suggested infection.  Urine culture was contaminated.  The patient was treated clinically with IV Rocephin.  The patient was given Tamiflu, oxygen, pulmonary support with albuterol.  The patient continued to improve slowly.  The patient was very weak and lethargic.  The patient also received steroids as she was quite short of breath with wheezing and crackles. As the patient continues to improve, the patient is being switched to oral medications with outpatient follow up. Preventative education given to the patient.  The patient advised to avoid sick contacts and to   follow up with medical doctor within a week of discharge   Patient observed for follow up via home visit and is alert and oriented x 3, in no acute distress. Patient observed sitting comfortably upright in living room chair  at time of visit. Patient states they are feeling "better" today. Patient reports fall at home yesterday 2/8/24, states she was wearing shoes with poor  and fell in living room. Patient denies any injury associated with the fall. Patient reports he  recently returned home from rehab and she has been overwhelmed with trying to care for him. Patient reports she was seen by PCP on 1/30/24 where she had repeat CT of chest done showing persistent PNA- was extended for her course of steroids- completed at this point. Patient reports compliance with medications- completed Abx. States she is no longer being followed by visiting nurse or PT by her request. Reports improved fatigue, cough and still with mild ARRIOLA. Ambulates without walker. Denies fever, chills. Patient states they are eating and drinking well. Denies any abdominal pain, palpitations, nausea, vomiting, diarrhea, lightheadedness, dizziness, shortness of breath, or chest pain.

## 2024-02-16 ENCOUNTER — TRANSCRIPTION ENCOUNTER (OUTPATIENT)
Age: 79
End: 2024-02-16

## 2024-02-16 ENCOUNTER — APPOINTMENT (OUTPATIENT)
Dept: CARE COORDINATION | Facility: HOME HEALTH | Age: 79
End: 2024-02-16
Payer: MEDICARE

## 2024-02-16 VITALS
RESPIRATION RATE: 18 BRPM | TEMPERATURE: 98.1 F | DIASTOLIC BLOOD PRESSURE: 77 MMHG | HEART RATE: 80 BPM | SYSTOLIC BLOOD PRESSURE: 120 MMHG | OXYGEN SATURATION: 97 %

## 2024-02-16 DIAGNOSIS — R30.0 DYSURIA: ICD-10-CM

## 2024-02-16 DIAGNOSIS — J06.9 ACUTE UPPER RESPIRATORY INFECTION, UNSPECIFIED: ICD-10-CM

## 2024-02-16 DIAGNOSIS — I10 ESSENTIAL (PRIMARY) HYPERTENSION: ICD-10-CM

## 2024-02-16 DIAGNOSIS — J18.9 PNEUMONIA, UNSPECIFIED ORGANISM: ICD-10-CM

## 2024-02-16 DIAGNOSIS — E03.9 HYPOTHYROIDISM, UNSPECIFIED: ICD-10-CM

## 2024-02-16 PROCEDURE — 99349 HOME/RES VST EST MOD MDM 40: CPT

## 2024-02-16 NOTE — ASSESSMENT
[FreeTextEntry1] : Patient is a 78 year old female enrolled in the [facility] program with a history of hypertension, hypothyroidism, arthritis, SLE, hysterectomy, intestinal disorder. Patient was admitted to [facility] for PNA.    Hospital chart reviewed and copied as per [facility] Discharge Summary: "The patient is an 78-year-old female with past medical history of hypertension, hypothyroidism, arthritis, SLE, hysterectomy, intestinal disorder, presented to the hospital with weakness, cough, chills, fever, shortness of breath, reduced oral intake.  The patient was worked   up in the hospital.  The patient was found to have crackles on lung exam.  The patient was short of breath, hypoxic, given oxygen 2 L via nasal cannula.  During the hospitalization, the patient had blood cultures obtained which were negative.  Urinalysis suggested infection.  Urine culture was contaminated.  The patient was treated clinically with IV Rocephin.  The patient was given Tamiflu, oxygen, pulmonary support with albuterol.  The patient continued to improve slowly.  The patient was very weak and lethargic.  The patient also received steroids as she was quite short of breath with wheezing and crackles. As the patient continues to improve, the patient is being switched to oral medications with outpatient follow up. Preventative education given to the patient.  The patient advised to avoid sick contacts and to   follow up with medical doctor within a week of discharge   Patient observed for follow up via home visit and is alert and oriented x 3, in no acute distress. Patient observed sitting comfortably upright in living room chair with granddaughters present at time of visit. Patient states they are feeling "ill" today. Patient reports congestion with yellow discharge, fatigue, and burning with urination x 2 days. Patient reports mildly impaired balance but denies any additional falls since 2/8/24, Patient denies any injury associated with the fall.  Patient reports compliance with medications. States she is no longer being followed by visiting nurse or PT by her request.  Ambulates without walker. Denies fever, chills. Patient states they are eating and drinking well. Denies any abdominal pain, palpitations, nausea, vomiting, diarrhea, lightheadedness, dizziness, shortness of breath, or chest pain.

## 2024-02-16 NOTE — HISTORY OF PRESENT ILLNESS
[Family Member] : family member [FreeTextEntry1] : Follow-up for discharge from [facility] for PNA.  [de-identified] : Patient is a 78 year old female enrolled in the [facility] program with a history of hypertension, hypothyroidism, arthritis, SLE, hysterectomy, intestinal disorder. Patient was admitted to [facility] for PNA.    Hospital chart reviewed and copied as per [facility] Discharge Summary: "The patient is an 78-year-old female with past medical history of hypertension, hypothyroidism, arthritis, SLE, hysterectomy, intestinal disorder, presented to the hospital with weakness, cough, chills, fever, shortness of breath, reduced oral intake.  The patient was worked   up in the hospital.  The patient was found to have crackles on lung exam.  The patient was short of breath, hypoxic, given oxygen 2 L via nasal cannula.  During the hospitalization, the patient had blood cultures obtained which were negative.  Urinalysis suggested infection.  Urine culture was contaminated.  The patient was treated clinically with IV Rocephin.  The patient was given Tamiflu, oxygen, pulmonary support with albuterol.  The patient continued to improve slowly.  The patient was very weak and lethargic.  The patient also received steroids as she was quite short of breath with wheezing and crackles. As the patient continues to improve, the patient is being switched to oral medications with outpatient follow up. Preventative education given to the patient.  The patient advised to avoid sick contacts and to   follow up with medical doctor within a week of discharge   Patient observed for follow up via home visit and is alert and oriented x 3, in no acute distress. Patient observed sitting comfortably upright in living room chair with granddaughters present at time of visit. Patient states they are feeling "ill" today. Patient reports congestion with yellow discharge, fatigue, and burning with urination x 2 days. Patient reports mildly impaired balance but denies any additional falls since 2/8/24, Patient denies any injury associated with the fall.  Patient reports compliance with medications. States she is no longer being followed by visiting nurse or PT by her request.  Ambulates without walker. Denies fever, chills. Patient states they are eating and drinking well. Denies any abdominal pain, palpitations, nausea, vomiting, diarrhea, lightheadedness, dizziness, shortness of breath, or chest pain.

## 2024-02-16 NOTE — PHYSICAL EXAM
[No Acute Distress] : no acute distress [Well Nourished] : well nourished [Well Developed] : well developed [Normal] : no jugular venous distention, supple, no lymphadenopathy and the thyroid was normal and there were no nodules present [No Respiratory Distress] : no respiratory distress  [No Accessory Muscle Use] : no accessory muscle use [Clear to Auscultation] : lungs were clear to auscultation bilaterally [Normal Rate] : normal rate  [Regular Rhythm] : with a regular rhythm [Normal S1, S2] : normal S1 and S2 [No Varicosities] : no varicosities [No Edema] : there was no peripheral edema [Soft] : abdomen soft [Non Tender] : non-tender [Non-distended] : non-distended [No Joint Swelling] : no joint swelling [Normal Gait] : normal gait [Normal Affect] : the affect was normal [Normal Insight/Judgement] : insight and judgment were intact [de-identified] : + bruising to bilateral arms s/p anticoagulant injection inpatient

## 2024-02-16 NOTE — REVIEW OF SYSTEMS
[Fever] : no fever [Chills] : no chills [Fatigue] : fatigue [Hot Flashes] : no hot flashes [Night Sweats] : no night sweats [Nasal Discharge] : nasal discharge [Chest Pain] : no chest pain [Palpitations] : no palpitations [Leg Claudication] : no leg claudication [Lower Ext Edema] : no lower extremity edema [Orthopnea] : no orthopnea [Shortness Of Breath] : no shortness of breath [Wheezing] : no wheezing [Cough] : cough [Dyspnea on Exertion] : dyspnea on exertion [Abdominal Pain] : no abdominal pain [Nausea] : no nausea [Constipation] : no constipation [Diarrhea] : diarrhea [Dysuria] : dysuria [Hematuria] : no hematuria [Joint Pain] : no joint pain [Muscle Pain] : no muscle pain [Itching] : no itching [Headache] : no headache [Dizziness] : no dizziness [Fainting] : no fainting [Memory Loss] : no memory loss [Unsteady Walking] : ataxia [Suicidal] : not suicidal [Anxiety] : no anxiety [Easy Bleeding] : no easy bleeding [Easy Bruising] : no easy bruising [Negative] : Heme/Lymph [de-identified] : + bruising to both arms

## 2024-02-17 ENCOUNTER — TRANSCRIPTION ENCOUNTER (OUTPATIENT)
Age: 79
End: 2024-02-17

## 2024-02-19 ENCOUNTER — TRANSCRIPTION ENCOUNTER (OUTPATIENT)
Age: 79
End: 2024-02-19

## 2024-02-22 ENCOUNTER — TRANSCRIPTION ENCOUNTER (OUTPATIENT)
Age: 79
End: 2024-02-22

## 2024-02-23 ENCOUNTER — TRANSCRIPTION ENCOUNTER (OUTPATIENT)
Age: 79
End: 2024-02-23

## 2024-02-23 RX ORDER — NITROFURANTOIN (MONOHYDRATE/MACROCRYSTALS) 25; 75 MG/1; MG/1
100 CAPSULE ORAL
Qty: 10 | Refills: 0 | Status: ACTIVE | COMMUNITY
Start: 2024-02-23 | End: 1900-01-01

## 2024-02-23 RX ORDER — AZITHROMYCIN 250 MG/1
250 TABLET, FILM COATED ORAL
Qty: 1 | Refills: 0 | Status: COMPLETED | COMMUNITY
Start: 2024-02-16 | End: 2024-02-23

## 2024-03-25 NOTE — REVIEW OF SYSTEMS
show [Fatigue] : fatigue [Cough] : cough [Dyspnea on Exertion] : dyspnea on exertion [Negative] : Heme/Lymph [Fever] : no fever [Chills] : no chills [Hot Flashes] : no hot flashes [Night Sweats] : no night sweats [Chest Pain] : no chest pain [Palpitations] : no palpitations [Leg Claudication] : no leg claudication [Lower Ext Edema] : no lower extremity edema [Orthopnea] : no orthopnea [Shortness Of Breath] : no shortness of breath [Wheezing] : no wheezing [Abdominal Pain] : no abdominal pain [Nausea] : no nausea [Constipation] : no constipation [Diarrhea] : diarrhea [Dysuria] : no dysuria [Hematuria] : no hematuria [Joint Pain] : no joint pain [Muscle Pain] : no muscle pain [Itching] : no itching [Headache] : no headache [Dizziness] : no dizziness [Fainting] : no fainting [Memory Loss] : no memory loss [Suicidal] : not suicidal [Anxiety] : no anxiety [Easy Bleeding] : no easy bleeding [Easy Bruising] : no easy bruising [de-identified] : + bruising to both arms

## 2024-04-18 ENCOUNTER — APPOINTMENT (OUTPATIENT)
Dept: RHEUMATOLOGY | Facility: CLINIC | Age: 79
End: 2024-04-18

## 2024-04-23 ENCOUNTER — APPOINTMENT (OUTPATIENT)
Dept: RHEUMATOLOGY | Facility: CLINIC | Age: 79
End: 2024-04-23
Payer: MEDICARE

## 2024-04-23 VITALS
DIASTOLIC BLOOD PRESSURE: 82 MMHG | WEIGHT: 149 LBS | HEART RATE: 76 BPM | SYSTOLIC BLOOD PRESSURE: 125 MMHG | BODY MASS INDEX: 29.25 KG/M2 | RESPIRATION RATE: 16 BRPM | HEIGHT: 60 IN | OXYGEN SATURATION: 99 %

## 2024-04-23 DIAGNOSIS — R76.8 OTHER SPECIFIED ABNORMAL IMMUNOLOGICAL FINDINGS IN SERUM: ICD-10-CM

## 2024-04-23 DIAGNOSIS — M06.4 INFLAMMATORY POLYARTHROPATHY: ICD-10-CM

## 2024-04-23 DIAGNOSIS — M75.51 BURSITIS OF RIGHT SHOULDER: ICD-10-CM

## 2024-04-23 DIAGNOSIS — M15.9 POLYOSTEOARTHRITIS, UNSPECIFIED: ICD-10-CM

## 2024-04-23 DIAGNOSIS — M70.61 TROCHANTERIC BURSITIS, RIGHT HIP: ICD-10-CM

## 2024-04-23 PROCEDURE — 20610 DRAIN/INJ JOINT/BURSA W/O US: CPT | Mod: RT

## 2024-04-23 PROCEDURE — 99214 OFFICE O/P EST MOD 30 MIN: CPT | Mod: 25

## 2024-04-23 PROCEDURE — G2211 COMPLEX E/M VISIT ADD ON: CPT

## 2024-04-23 RX ORDER — TRIAMCINOLONE ACETONIDE 40 MG/ML
40 SUSPENSION INTRA-ARTERIAL; INTRAMUSCULAR
Qty: 1 | Refills: 0 | Status: COMPLETED
Start: 2024-04-23 | End: 2024-04-25

## 2024-04-23 RX ORDER — TRIAMCINOLONE ACETONIDE 40 MG/ML
40 SUSPENSION INTRA-ARTERIAL; INTRAMUSCULAR
Qty: 1 | Refills: 0 | Status: COMPLETED | OUTPATIENT
Start: 2024-04-23 | End: 2024-04-25

## 2024-04-23 RX ORDER — HYDROXYCHLOROQUINE SULFATE 200 MG/1
200 TABLET, FILM COATED ORAL
Qty: 180 | Refills: 1 | Status: ACTIVE | COMMUNITY
Start: 2019-05-29 | End: 1900-01-01

## 2024-04-23 RX ORDER — PREDNISONE 10 MG/1
TABLET ORAL
Refills: 0 | Status: DISCONTINUED | COMMUNITY
End: 2024-04-23

## 2024-04-23 RX ORDER — DULOXETINE HYDROCHLORIDE 40 MG/1
40 CAPSULE, DELAYED RELEASE PELLETS ORAL DAILY
Qty: 30 | Refills: 0 | Status: ACTIVE | COMMUNITY
Start: 2019-08-29

## 2024-04-23 RX ADMIN — TRIAMCINOLONE ACETONIDE 0 MG/ML: 40 SUSPENSION INTRA-ARTERIAL; INTRAMUSCULAR at 00:00

## 2024-04-23 NOTE — PHYSICAL EXAM
[General Appearance - Alert] : alert [General Appearance - In No Acute Distress] : in no acute distress [General Appearance - Well Nourished] : well nourished [General Appearance - Well Developed] : well developed [] : no rash [Motor Exam] : the motor exam was normal

## 2024-04-24 ENCOUNTER — APPOINTMENT (OUTPATIENT)
Dept: RHEUMATOLOGY | Facility: CLINIC | Age: 79
End: 2024-04-24

## 2024-07-26 ENCOUNTER — APPOINTMENT (OUTPATIENT)
Dept: RHEUMATOLOGY | Facility: CLINIC | Age: 79
End: 2024-07-26
Payer: MEDICARE

## 2024-07-26 VITALS
OXYGEN SATURATION: 98 % | DIASTOLIC BLOOD PRESSURE: 60 MMHG | HEIGHT: 60 IN | WEIGHT: 142 LBS | BODY MASS INDEX: 27.88 KG/M2 | SYSTOLIC BLOOD PRESSURE: 104 MMHG | HEART RATE: 87 BPM

## 2024-07-26 DIAGNOSIS — M70.61 TROCHANTERIC BURSITIS, RIGHT HIP: ICD-10-CM

## 2024-07-26 DIAGNOSIS — M15.9 POLYOSTEOARTHRITIS, UNSPECIFIED: ICD-10-CM

## 2024-07-26 DIAGNOSIS — M06.4 INFLAMMATORY POLYARTHROPATHY: ICD-10-CM

## 2024-07-26 DIAGNOSIS — R76.8 OTHER SPECIFIED ABNORMAL IMMUNOLOGICAL FINDINGS IN SERUM: ICD-10-CM

## 2024-07-26 DIAGNOSIS — M48.062 SPINAL STENOSIS, LUMBAR REGION WITH NEUROGENIC CLAUDICATION: ICD-10-CM

## 2024-07-26 PROCEDURE — 99214 OFFICE O/P EST MOD 30 MIN: CPT

## 2024-07-26 PROCEDURE — G2211 COMPLEX E/M VISIT ADD ON: CPT

## 2024-07-26 NOTE — HISTORY OF PRESENT ILLNESS
[FreeTextEntry1] : Patient is complaining of symptoms suggesting neurogenic claudication with pain in the right low back radiating into leg while walking. She is taking duloxetine 40 mg daily but not taking Tylenol as previously advised. Reports that steroid injection in the right trochanteric bursa area at the last visit was very effective at reducing bursitis and shoulder arthritis pain. Sees ophtho regularly.

## 2024-07-26 NOTE — PHYSICAL EXAM
[General Appearance - Alert] : alert [General Appearance - In No Acute Distress] : in no acute distress [General Appearance - Well Nourished] : well nourished [General Appearance - Well Developed] : well developed [] : no rash [Motor Exam] : the motor exam was normal [FreeTextEntry1] : There is no evidence of active synovitis. There are severe Heberden's and less prominent Thelma's nodes. There is squaring of the wrists bilaterally.

## 2025-01-27 ENCOUNTER — RX RENEWAL (OUTPATIENT)
Age: 80
End: 2025-01-27

## 2025-05-02 ENCOUNTER — RX RENEWAL (OUTPATIENT)
Age: 80
End: 2025-05-02

## 2025-05-29 ENCOUNTER — APPOINTMENT (OUTPATIENT)
Dept: RHEUMATOLOGY | Facility: CLINIC | Age: 80
End: 2025-05-29

## 2025-08-28 ENCOUNTER — APPOINTMENT (OUTPATIENT)
Dept: RHEUMATOLOGY | Facility: CLINIC | Age: 80
End: 2025-08-28
Payer: MEDICARE

## 2025-08-28 VITALS
DIASTOLIC BLOOD PRESSURE: 80 MMHG | SYSTOLIC BLOOD PRESSURE: 130 MMHG | OXYGEN SATURATION: 98 % | WEIGHT: 145 LBS | BODY MASS INDEX: 28.47 KG/M2 | HEIGHT: 60 IN | HEART RATE: 77 BPM

## 2025-08-28 DIAGNOSIS — Z11.59 ENCOUNTER FOR SCREENING FOR OTHER VIRAL DISEASES: ICD-10-CM

## 2025-08-28 DIAGNOSIS — Z11.1 ENCOUNTER FOR SCREENING FOR RESPIRATORY TUBERCULOSIS: ICD-10-CM

## 2025-08-28 DIAGNOSIS — M06.4 INFLAMMATORY POLYARTHROPATHY: ICD-10-CM

## 2025-08-28 DIAGNOSIS — R76.8 OTHER SPECIFIED ABNORMAL IMMUNOLOGICAL FINDINGS IN SERUM: ICD-10-CM

## 2025-08-28 DIAGNOSIS — K76.0 FATTY (CHANGE OF) LIVER, NOT ELSEWHERE CLASSIFIED: ICD-10-CM

## 2025-08-28 PROCEDURE — 99214 OFFICE O/P EST MOD 30 MIN: CPT

## 2025-08-28 PROCEDURE — G2211 COMPLEX E/M VISIT ADD ON: CPT

## 2025-08-28 RX ORDER — PREDNISONE 5 MG/1
5 TABLET ORAL
Qty: 90 | Refills: 1 | Status: ACTIVE | COMMUNITY
Start: 2025-08-28 | End: 1900-01-01

## 2025-08-29 LAB
CRP SERPL-MCNC: 6 MG/L
ERYTHROCYTE [SEDIMENTATION RATE] IN BLOOD BY WESTERGREN METHOD: 15 MM/HR
HBV SURFACE AG SER QL: NONREACTIVE
HCV AB SER QL: NONREACTIVE
HCV S/CO RATIO: 0.08 S/CO

## 2025-09-03 DIAGNOSIS — R76.8 OTHER SPECIFIED ABNORMAL IMMUNOLOGICAL FINDINGS IN SERUM: ICD-10-CM

## 2025-09-03 LAB
DSDNA AB SER-ACNC: 2 IU/ML
ENA RNP AB SER-ACNC: 4.4 AL
ENA SM AB SER-ACNC: <0.2 AL
ENA SS-A AB SER-ACNC: <0.2 AL
ENA SS-B AB SER-ACNC: <0.2 AL
M TB IFN-G BLD-IMP: NEGATIVE
QUANTIFERON TB PLUS MITOGEN MINUS NIL: >10 IU/ML
QUANTIFERON TB PLUS NIL: 0.02 IU/ML
QUANTIFERON TB PLUS TB1 MINUS NIL: 0 IU/ML
QUANTIFERON TB PLUS TB2 MINUS NIL: 0 IU/ML

## 2025-09-04 LAB — HISTONE AB SER QL: 0.7 UNITS

## 2025-09-12 ENCOUNTER — APPOINTMENT (OUTPATIENT)
Dept: RHEUMATOLOGY | Facility: CLINIC | Age: 80
End: 2025-09-12
Payer: MEDICARE

## 2025-09-12 VITALS
WEIGHT: 145 LBS | TEMPERATURE: 97.3 F | HEIGHT: 60 IN | BODY MASS INDEX: 28.47 KG/M2 | DIASTOLIC BLOOD PRESSURE: 76 MMHG | HEART RATE: 69 BPM | OXYGEN SATURATION: 98 % | SYSTOLIC BLOOD PRESSURE: 128 MMHG

## 2025-09-12 DIAGNOSIS — K76.0 FATTY (CHANGE OF) LIVER, NOT ELSEWHERE CLASSIFIED: ICD-10-CM

## 2025-09-12 DIAGNOSIS — M06.4 INFLAMMATORY POLYARTHROPATHY: ICD-10-CM

## 2025-09-12 DIAGNOSIS — R76.8 OTHER SPECIFIED ABNORMAL IMMUNOLOGICAL FINDINGS IN SERUM: ICD-10-CM

## 2025-09-12 PROCEDURE — 99214 OFFICE O/P EST MOD 30 MIN: CPT

## 2025-09-12 PROCEDURE — G2211 COMPLEX E/M VISIT ADD ON: CPT

## 2025-09-12 RX ORDER — LEFLUNOMIDE 20 MG/1
20 TABLET, FILM COATED ORAL
Qty: 90 | Refills: 1 | Status: ACTIVE | COMMUNITY
Start: 2025-09-12 | End: 1900-01-01